# Patient Record
Sex: MALE | Race: WHITE | URBAN - METROPOLITAN AREA
[De-identification: names, ages, dates, MRNs, and addresses within clinical notes are randomized per-mention and may not be internally consistent; named-entity substitution may affect disease eponyms.]

---

## 2023-01-06 ENCOUNTER — OFFICE VISIT (OUTPATIENT)
Dept: FAMILY MEDICINE CLINIC | Age: 23
End: 2023-01-06
Payer: COMMERCIAL

## 2023-01-06 VITALS
DIASTOLIC BLOOD PRESSURE: 77 MMHG | WEIGHT: 209 LBS | HEIGHT: 71 IN | TEMPERATURE: 98.2 F | OXYGEN SATURATION: 97 % | SYSTOLIC BLOOD PRESSURE: 121 MMHG | RESPIRATION RATE: 16 BRPM | HEART RATE: 78 BPM | BODY MASS INDEX: 29.26 KG/M2

## 2023-01-06 DIAGNOSIS — F84.0 AUTISM: Primary | ICD-10-CM

## 2023-01-06 DIAGNOSIS — F90.2 ATTENTION DEFICIT HYPERACTIVITY DISORDER (ADHD), COMBINED TYPE: ICD-10-CM

## 2023-01-06 PROBLEM — R47.9 SPEECH IMPEDIMENT: Status: ACTIVE | Noted: 2023-01-06

## 2023-01-06 RX ORDER — DEXTROAMPHETAMINE SACCHARATE, AMPHETAMINE ASPARTATE MONOHYDRATE, DEXTROAMPHETAMINE SULFATE AND AMPHETAMINE SULFATE 6.25; 6.25; 6.25; 6.25 MG/1; MG/1; MG/1; MG/1
25 CAPSULE, EXTENDED RELEASE ORAL
Qty: 30 CAPSULE | Refills: 0 | Status: SHIPPED | OUTPATIENT
Start: 2023-01-06

## 2023-01-06 RX ORDER — DEXTROAMPHETAMINE SACCHARATE, AMPHETAMINE ASPARTATE MONOHYDRATE, DEXTROAMPHETAMINE SULFATE AND AMPHETAMINE SULFATE 7.5; 7.5; 7.5; 7.5 MG/1; MG/1; MG/1; MG/1
30 CAPSULE, EXTENDED RELEASE ORAL
COMMUNITY
Start: 2022-11-03 | End: 2023-01-06

## 2023-01-06 NOTE — PROGRESS NOTES
María Alvarado is a 23 y.o. presents today for   Chief Complaint   Patient presents with    New Patient     Est care     Is someone accompanying this pt? No    Is the patient using any DME equipment during OV? No    Visit Vitals  Ht 5' 10\" (1.778 m)       Depression Screening:   3 most recent PHQ Screens 1/6/2023   Little interest or pleasure in doing things Not at all   Feeling down, depressed, irritable, or hopeless Not at all   Total Score PHQ 2 0       Health Maintenance: reviewed and discussed and ordered per Provider. Health Maintenance Due   Topic Date Due    Hepatitis C Screening  Never done    Depression Screen  Never done    COVID-19 Vaccine (3 - Booster for Pfizer series) 06/26/2021         Coordination of Care:   1. \"Have you been to the ER, urgent care clinic since your last visit? Hospitalized since your last visit? \" No    2. \"Have you seen or consulted any other health care providers outside of the [address] [place] [address] since your last visit? \" No     3. For patients aged 39-70: Has the patient had a colonoscopy / FIT/ Cologuard? NA - based on age    If the patient is female:    4. For patients aged 41-77: Has the patient had a mammogram within the past 2 years? NA - based on age or sex    11. For patients aged 21-65: Has the patient had a pap smear? NA - based on age or sex     Advanced Directive:  1. Do you have an Advanced Directive? No     2. Would you like information on Advanced Directives?  No    Sivakumar Nunes CMA

## 2023-01-06 NOTE — PROGRESS NOTES
Irina Lara (: 2000) is a 26 y.o. male, established patient, here for evaluation of the following chief complaint(s):  New Patient ([facility])         Subjective:     HPI:  Patient presents to [facility]. He reports past medical history of autism and ADHD for which he was being treated by prior pediatrician. History reviewed. No pertinent past medical history. History reviewed. No pertinent surgical history. ROS:    General: negative for - chills, fever, weight changes or malaise  HEENT: no sore throat, nasal congestion, vision problems or ear problems  Respiratory: no cough, shortness of breath, or wheezing  Cardiovascular: no chest pain, palpitations, or dyspnea on exertion  Gastrointestinal: no abdominal pain, N/V, change in bowel habits  Musculoskeletal: no back pain or joint pain  Neurological: no headache or dizziness  Endo:  No polyuria or polydipsia  : no urinary  Psychological: negative for - anxiety, depression, sleeps issues       Objective:     Blood pressure 121/77, pulse 78, temperature 98.2 °F (36.8 °C), temperature source Temporal, resp. rate 16, height 5' 11\" (1.803 m), weight 209 lb (94.8 kg), SpO2 97 %. Physical Exam:  Patient appears well nourished, alert, oriented x 3, in no distress. ENT normal.  Neck supple. No adenopathy or thyromegaly. MARCELL. Lungs are clear to auscultation bilaterally. Breathing is unlabored and regular rhythm. No wheezes, no rhonchi. Cardiovascular, S1 and S2 normal, no murmurs, regular rate and rhythm. Chest wall negative for tenderness  Abdomen is soft without tenderness, no guarding  /Anorectal, deferred. Muscleskeletal, no swelling, no tenderness, no injury. Extremities show no edema bilaterally. Neurological is normal without focal findings. Skin: no concerning lesions. Psych: normal affect. Mood good. Oriented x 3. Assessment & Plan:      Diagnoses and all orders for this visit:    1.  Autism  -     REFERRAL TO PSYCHIATRY    2. Attention deficit hyperactivity disorder (ADHD), combined type  -     REFERRAL TO PSYCHIATRY  -     11-DRUG SCREEN, URINE; Future  -     EKG, 12 LEAD, INITIAL; Future  -     amphetamine-dextroamphetamine XR (ADDERALL XR) 25 mg XR capsule; Take 1 Capsule by mouth every morning. Max Daily Amount: 25 mg. Indications: attention deficit disorder with hyperactivity         Follow-up and Dispositions    Return in about 2 weeks (around 1/20/2023) for adhd ok to double            3 months physical.            On this date 01/06/2023 I have spent 20 minutes reviewing previous notes, test results and face to face with the patient discussing the diagnosis and importance of compliance with the treatment plan as well as documenting on the day of the visit. An electronic signature was used to authenticate this note.   -- KATHERIN Calix

## 2023-01-09 ENCOUNTER — HOSPITAL ENCOUNTER (OUTPATIENT)
Dept: LAB | Age: 23
Discharge: HOME | End: 2023-01-09
Payer: COMMERCIAL

## 2023-01-09 DIAGNOSIS — F90.2 ATTENTION DEFICIT HYPERACTIVITY DISORDER (ADHD), COMBINED TYPE: ICD-10-CM

## 2023-01-09 PROCEDURE — 80307 DRUG TEST PRSMV CHEM ANLYZR: CPT

## 2023-01-09 PROCEDURE — 36415 COLL VENOUS BLD VENIPUNCTURE: CPT

## 2023-01-10 LAB
AMPHETAMINES UR QL SCN: POSITIVE NG/ML
BARBITURATES UR QL SCN: NEGATIVE NG/ML
BENZODIAZ UR QL SCN: NEGATIVE NG/ML
BUPRENORPHINE UR QL: NEGATIVE NG/ML
BZE UR QL SCN: NEGATIVE NG/ML
CANNABINOIDS UR QL SCN: NEGATIVE NG/ML
CREAT UR-MCNC: 268.2 MG/DL (ref 20–300)
METHADONE UR QL SCN: NEGATIVE NG/ML
OPIATES UR QL SCN: NEGATIVE NG/ML
OXYCODONE+OXYMORPHONE UR QL SCN: NEGATIVE NG/ML
PCP UR QL: NEGATIVE NG/ML
PH UR: 6 (ref 4.5–8.9)
PLEASE NOTE:, 733163: ABNORMAL
PROPOXYPH UR QL SCN: NEGATIVE NG/ML

## 2023-01-20 ENCOUNTER — OFFICE VISIT (OUTPATIENT)
Dept: FAMILY MEDICINE CLINIC | Age: 23
End: 2023-01-20
Payer: COMMERCIAL

## 2023-01-20 VITALS
HEART RATE: 74 BPM | DIASTOLIC BLOOD PRESSURE: 72 MMHG | TEMPERATURE: 98.2 F | SYSTOLIC BLOOD PRESSURE: 114 MMHG | WEIGHT: 212 LBS | RESPIRATION RATE: 17 BRPM | BODY MASS INDEX: 29.57 KG/M2 | OXYGEN SATURATION: 97 %

## 2023-01-20 DIAGNOSIS — F90.2 ATTENTION DEFICIT HYPERACTIVITY DISORDER (ADHD), COMBINED TYPE: Primary | ICD-10-CM

## 2023-01-20 DIAGNOSIS — F84.0 AUTISM: ICD-10-CM

## 2023-01-20 PROCEDURE — 99213 OFFICE O/P EST LOW 20 MIN: CPT

## 2023-01-20 NOTE — PROGRESS NOTES
Madhuri Wei (: 2000) is a 26 y.o. male, established patient, here for evaluation of the following chief complaint(s):  Follow-up (2 week follow up - ADHD)         Subjective:     HPI:  He reports that he has not started lower dose of ADHD medications yet and has not heard from psychiatry to make appointment. He denies any acute complaints, no chest pain, no dyspnea. Past Medical History:   Diagnosis Date    ADHD        History reviewed. No pertinent surgical history. ROS:    General: negative for - chills, fever, weight changes or malaise  HEENT: no sore throat, nasal congestion, vision problems or ear problems  Respiratory: no cough, shortness of breath, or wheezing  Cardiovascular: no chest pain, palpitations, or dyspnea on exertion  Gastrointestinal: no abdominal pain, N/V, change in bowel habits  Musculoskeletal: no back pain or joint pain  Neurological: no headache or dizziness  Endo:  No polyuria or polydipsia  : no urinary  Psychological: negative for - anxiety, depression, sleeps issues       Objective:     Blood pressure 114/72, pulse 74, temperature 98.2 °F (36.8 °C), temperature source Temporal, resp. rate 17, height (P) 5' 11\" (1.803 m), weight 212 lb (96.2 kg), SpO2 97 %. Physical Exam:  Patient appears well nourished, alert, oriented x 3, in no distress. ENT normal.  Neck supple. No adenopathy or thyromegaly. MARCELL. Lungs are clear to auscultation bilaterally. Breathing is unlabored and regular rhythm. No wheezes, no rhonchi. Cardiovascular, S1 and S2 normal, no murmurs, regular rate and rhythm. Chest wall negative for tenderness  Abdomen is soft without tenderness, no guarding  /Anorectal, deferred. Muscleskeletal, no swelling, no tenderness, no injury. Extremities show no edema bilaterally. Neurological is normal without focal findings. Skin: no concerning lesions. Psych: normal affect. Mood good. Oriented x 3.       Assessment & Plan:      Diagnoses and all orders for this visit:    1. Attention deficit hyperactivity disorder (ADHD), combined type  -     LIPID PANEL; Future  -     MICROALBUMIN, UR, RAND W/ MICROALB/CREAT RATIO; Future  -     URINALYSIS W/ RFLX MICROSCOPIC; Future  -     HEMOGLOBIN A1C WITH EAG; Future  -     TSH 3RD GENERATION; Future  -     METABOLIC PANEL, BASIC; Future  -     CBC WITH AUTOMATED DIFF; Future    2. Autism  -     METABOLIC PANEL, BASIC; Future  -     CBC WITH AUTOMATED DIFF; Future             On this date 01/20/2023 I have spent 20 minutes reviewing previous notes, test results and face to face with the patient discussing the diagnosis and importance of compliance with the treatment plan as well as documenting on the day of the visit. An electronic signature was used to authenticate this note.   -- KATHERIN Limon

## 2023-01-20 NOTE — PROGRESS NOTES
Vicenta Macias is a 26 y.o. presents today for   Chief Complaint   Patient presents with    Follow-up     2 week follow up - ADHD     Is someone accompanying this pt? No    Is the patient using any DME equipment during OV? No    Visit Vitals  /72 (BP 1 Location: Left upper arm, BP Patient Position: Sitting, BP Cuff Size: Large adult)   Pulse 74   Temp 98.2 °F (36.8 °C) (Temporal)   Resp 17   Ht (P) 5' 11\" (1.803 m)   Wt 212 lb (96.2 kg)   SpO2 97%   BMI (P) 29.57 kg/m²       Depression Screening:   3 most recent PHQ Screens 1/20/2023   Little interest or pleasure in doing things Not at all   Feeling down, depressed, irritable, or hopeless Not at all   Total Score PHQ 2 0       Health Maintenance: reviewed and discussed and ordered per Provider. Health Maintenance Due   Topic Date Due    Hepatitis C Screening  Never done    COVID-19 Vaccine (3 - Booster for Pfizer series) 06/26/2021         Coordination of Care:   1. \"Have you been to the ER, urgent care clinic since your last visit? Hospitalized since your last visit? \" No    2. \"Have you seen or consulted any other health care providers outside of the [address] [place] [address] since your last visit? \" No     3. For patients aged 39-70: Has the patient had a colonoscopy / FIT/ Cologuard? NA - based on age    If the patient is female:    4. For patients aged 41-77: Has the patient had a mammogram within the past 2 years? NA - based on age or sex    11. For patients aged 21-65: Has the patient had a pap smear? NA - based on age or sex     Advanced Directive:  1. Do you have an Advanced Directive? No     2. Would you like information on Advanced Directives?  No    DAVIN Tyler

## 2023-03-14 DIAGNOSIS — F90.2 ATTENTION-DEFICIT HYPERACTIVITY DISORDER, COMBINED TYPE: Primary | ICD-10-CM

## 2023-03-14 RX ORDER — DEXTROAMPHETAMINE SACCHARATE, AMPHETAMINE ASPARTATE MONOHYDRATE, DEXTROAMPHETAMINE SULFATE AND AMPHETAMINE SULFATE 6.25; 6.25; 6.25; 6.25 MG/1; MG/1; MG/1; MG/1
CAPSULE, EXTENDED RELEASE ORAL
COMMUNITY
Start: 2023-01-06 | End: 2023-03-16 | Stop reason: SDUPTHER

## 2023-03-16 RX ORDER — DEXTROAMPHETAMINE SACCHARATE, AMPHETAMINE ASPARTATE MONOHYDRATE, DEXTROAMPHETAMINE SULFATE AND AMPHETAMINE SULFATE 6.25; 6.25; 6.25; 6.25 MG/1; MG/1; MG/1; MG/1
CAPSULE, EXTENDED RELEASE ORAL
Qty: 60 CAPSULE | Refills: 0 | Status: SHIPPED | OUTPATIENT
Start: 2023-03-16 | End: 2023-04-13 | Stop reason: SDUPTHER

## 2023-06-26 ENCOUNTER — OFFICE VISIT (OUTPATIENT)
Facility: CLINIC | Age: 23
End: 2023-06-26
Payer: COMMERCIAL

## 2023-06-26 VITALS
HEIGHT: 71 IN | BODY MASS INDEX: 28.36 KG/M2 | TEMPERATURE: 97.3 F | SYSTOLIC BLOOD PRESSURE: 121 MMHG | DIASTOLIC BLOOD PRESSURE: 66 MMHG | WEIGHT: 202.6 LBS | RESPIRATION RATE: 16 BRPM | OXYGEN SATURATION: 96 % | HEART RATE: 66 BPM

## 2023-06-26 DIAGNOSIS — F90.2 ATTENTION-DEFICIT HYPERACTIVITY DISORDER, COMBINED TYPE: ICD-10-CM

## 2023-06-26 PROCEDURE — 99213 OFFICE O/P EST LOW 20 MIN: CPT

## 2023-06-26 RX ORDER — DEXTROAMPHETAMINE SACCHARATE, AMPHETAMINE ASPARTATE MONOHYDRATE, DEXTROAMPHETAMINE SULFATE AND AMPHETAMINE SULFATE 6.25; 6.25; 6.25; 6.25 MG/1; MG/1; MG/1; MG/1
CAPSULE, EXTENDED RELEASE ORAL
Qty: 60 CAPSULE | Refills: 0 | Status: SHIPPED | OUTPATIENT
Start: 2023-06-26 | End: 2023-08-26

## 2023-06-26 SDOH — ECONOMIC STABILITY: INCOME INSECURITY: IN THE LAST 12 MONTHS, WAS THERE A TIME WHEN YOU WERE NOT ABLE TO PAY THE MORTGAGE OR RENT ON TIME?: NO

## 2023-06-26 SDOH — ECONOMIC STABILITY: HOUSING INSECURITY
IN THE LAST 12 MONTHS, WAS THERE A TIME WHEN YOU DID NOT HAVE A STEADY PLACE TO SLEEP OR SLEPT IN A SHELTER (INCLUDING NOW)?: NO

## 2023-06-26 SDOH — ECONOMIC STABILITY: FOOD INSECURITY: WITHIN THE PAST 12 MONTHS, YOU WORRIED THAT YOUR FOOD WOULD RUN OUT BEFORE YOU GOT MONEY TO BUY MORE.: NEVER TRUE

## 2023-06-26 SDOH — ECONOMIC STABILITY: TRANSPORTATION INSECURITY
IN THE PAST 12 MONTHS, HAS THE LACK OF TRANSPORTATION KEPT YOU FROM MEDICAL APPOINTMENTS OR FROM GETTING MEDICATIONS?: NO

## 2023-06-26 SDOH — ECONOMIC STABILITY: TRANSPORTATION INSECURITY
IN THE PAST 12 MONTHS, HAS LACK OF TRANSPORTATION KEPT YOU FROM MEETINGS, WORK, OR FROM GETTING THINGS NEEDED FOR DAILY LIVING?: NO

## 2023-06-26 SDOH — ECONOMIC STABILITY: FOOD INSECURITY: WITHIN THE PAST 12 MONTHS, THE FOOD YOU BOUGHT JUST DIDN'T LAST AND YOU DIDN'T HAVE MONEY TO GET MORE.: NEVER TRUE

## 2023-06-26 SDOH — ECONOMIC STABILITY: INCOME INSECURITY: HOW HARD IS IT FOR YOU TO PAY FOR THE VERY BASICS LIKE FOOD, HOUSING, MEDICAL CARE, AND HEATING?: NOT HARD AT ALL

## 2023-06-26 ASSESSMENT — PATIENT HEALTH QUESTIONNAIRE - PHQ9
1. LITTLE INTEREST OR PLEASURE IN DOING THINGS: 0
2. FEELING DOWN, DEPRESSED OR HOPELESS: 0
SUM OF ALL RESPONSES TO PHQ QUESTIONS 1-9: 0
SUM OF ALL RESPONSES TO PHQ9 QUESTIONS 1 & 2: 0
SUM OF ALL RESPONSES TO PHQ QUESTIONS 1-9: 0

## 2023-06-26 ASSESSMENT — LIFESTYLE VARIABLES
HOW MANY STANDARD DRINKS CONTAINING ALCOHOL DO YOU HAVE ON A TYPICAL DAY: PATIENT DOES NOT DRINK
HOW OFTEN DO YOU HAVE A DRINK CONTAINING ALCOHOL: NEVER